# Patient Record
(demographics unavailable — no encounter records)

---

## 2024-12-16 NOTE — HISTORY OF PRESENT ILLNESS
[de-identified] :  MARYLOU DALAL is a 62 year old male who presents to the 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 I last saw the patient on 9/30/24. Pt was to discontinue inhaled steroid and follow up in 3mths. Voice has been stable, no different from last visit. No difficulty swallowing or breathing. Continues to smoke 1 pack per day. Denies fevers, chills and unintentional weight loss.  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  Previously reported: incidental finding of vocal fold cyst. He is referred by Dr. Beck Victor. He now does note periods of normal voicing. He does not note specific difficulties with swallowing. He does not note frequent classic heartburn symptoms. Smoking history: Current daily smoker, 40 year smoker, 40 pack years, current pack a day. No voice issues. 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  Prior Pertinent Procedures: 8/21/24: DL, excision of left vocal fold polyp; Dr. Madsen

## 2024-12-16 NOTE — PROCEDURE
[de-identified] : Laryngoscopy: Stroboscopic Laryngoscopy Procedure Note: Indication: Assess laryngeal biomechanics and vocal fold oscillation. Description of Procedure: Informed consent was verbally obtained from the patient prior to the procedure. The patient was seated in the clinic chair. Topical anesthesia was achieved by first spraying the nasal cavities with 4% lidocaine and nasal decongestant.  Findings: Supraglottis: mild leukoplakia petiole of epiglottis Glottis: Structure:  Right: crisp and shows no lesions or masses, minimal flat leukoplakia  Left: crisp and shows no lesions or masses  Mobility:  Right: normal  Left: normal  Amplitude:  Right: normal  Left: normal  Closure: complete  Wave symmetry: symmetric Subglottis: small anterior cyst, Visualized airway is widely patent.

## 2024-12-16 NOTE — HISTORY OF PRESENT ILLNESS
[de-identified] :  MARYLOU DALAL is a 62 year old male who presents to the 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 I last saw the patient on 9/30/24. Pt was to discontinue inhaled steroid and follow up in 3mths. Voice has been stable, no different from last visit. No difficulty swallowing or breathing. Continues to smoke 1 pack per day. Denies fevers, chills and unintentional weight loss.  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  Previously reported: incidental finding of vocal fold cyst. He is referred by Dr. Beck Victor. He now does note periods of normal voicing. He does not note specific difficulties with swallowing. He does not note frequent classic heartburn symptoms. Smoking history: Current daily smoker, 40 year smoker, 40 pack years, current pack a day. No voice issues. 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  Prior Pertinent Procedures: 8/21/24: DL, excision of left vocal fold polyp; Dr. Madsen

## 2024-12-16 NOTE — ASSESSMENT
[FreeTextEntry1] : Assessment/Plan: #1 Hx of laryngeal candidiasis #2 Left vocal fold polyp s/p excision #3 Active smoker #4 Minimal vocal fold leukoplakia #5 Mild petiole leukoplakia  Again discussed smoking cessation. Will follow up in 3 months.

## 2024-12-16 NOTE — PROCEDURE
[de-identified] : Laryngoscopy: Stroboscopic Laryngoscopy Procedure Note: Indication: Assess laryngeal biomechanics and vocal fold oscillation. Description of Procedure: Informed consent was verbally obtained from the patient prior to the procedure. The patient was seated in the clinic chair. Topical anesthesia was achieved by first spraying the nasal cavities with 4% lidocaine and nasal decongestant.  Findings: Supraglottis: mild leukoplakia petiole of epiglottis Glottis: Structure:  Right: crisp and shows no lesions or masses, minimal flat leukoplakia  Left: crisp and shows no lesions or masses  Mobility:  Right: normal  Left: normal  Amplitude:  Right: normal  Left: normal  Closure: complete  Wave symmetry: symmetric Subglottis: small anterior cyst, Visualized airway is widely patent.

## 2025-03-18 NOTE — PROCEDURE
[de-identified] : Stroboscopic Laryngoscopy Procedure Note: Indication: Assess laryngeal biomechanics and vocal fold oscillation. Description of Procedure: Informed consent was verbally obtained from the patient prior to the procedure. The patient was seated in the clinic chair. Topical anesthesia was achieved by first spraying the nasal cavities with 4% lidocaine and nasal decongestant.  Findings: Supraglottis: mild leukoplakia petiole of epiglottis Glottis: Structure:  Right: crisp and shows no lesions or masses, minimal flat leukoplakia  Left: crisp and shows no lesions or masses  Mobility:  Right: normal  Left: normal  Amplitude:  Right: normal  Left: normal  Closure: complete  Wave symmetry: symmetric Subglottis: small anterior cyst, Visualized airway is widely patent.

## 2025-03-18 NOTE — PROCEDURE
[de-identified] : Stroboscopic Laryngoscopy Procedure Note: Indication: Assess laryngeal biomechanics and vocal fold oscillation. Description of Procedure: Informed consent was verbally obtained from the patient prior to the procedure. The patient was seated in the clinic chair. Topical anesthesia was achieved by first spraying the nasal cavities with 4% lidocaine and nasal decongestant.  Findings: Supraglottis: mild leukoplakia petiole of epiglottis Glottis: Structure:  Right: crisp and shows no lesions or masses, minimal flat leukoplakia  Left: crisp and shows no lesions or masses  Mobility:  Right: normal  Left: normal  Amplitude:  Right: normal  Left: normal  Closure: complete  Wave symmetry: symmetric Subglottis: small anterior cyst, Visualized airway is widely patent.

## 2025-03-18 NOTE — HISTORY OF PRESENT ILLNESS
[de-identified] :  MARYLOU DALAL is a 62 year old male who presents to the 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 I last saw the patient on 12/16/24. Discussed smoking cessation and was to follow up in 3mths.  Recently had the flu last month and is now using supplemental oxygen more frequently at 3L [place]. Has residual cough from flu. States breathing has gotten worse, worst with exertion. Also complaining of chronic nasal congestion. Voice remains raspy- no pain while speaking or complete loss of voice. Voice is stable. Continues to smoke, trying to quit, smoking less now.  ~1/2 pack per day No recent oral infections  Eating and drinking well with no s/s of aspiration.  Denies s/s of reflux, throat pain, globus sensation, fevers/chills, neck swelling and hemoptysis.   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  Previously reported: incidental finding of vocal fold cyst. He is referred by Dr. Beck Victor. He now does note periods of normal voicing. He does not note specific difficulties with swallowing. He does not note frequent classic heartburn symptoms. Smoking history: Current daily smoker, 40 year smoker, 40 pack years, current pack a day. No voice issues. 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  Prior Pertinent Procedures: 8/21/24: DL, excision of left vocal fold polyp; Dr. Madsen

## 2025-03-18 NOTE — ASSESSMENT
[FreeTextEntry1] : Assessment/Plan: #1 Hx of laryngeal candidiasis #2 Left vocal fold polyp s/p excision #3 Active smoker #4 Minimal vocal fold leukoplakia #5 Mild petiole leukoplakia  Again discussed smoking cessation.   Patient was offered the folowin) In office biopsy of petiole leukoplakia 2) In OR direct laryngoscopy with excision of petiole leukoplakia   The risks, benefits, and alternatives to care were discussed with the patient and understanding expressed.  Instead he elected for follow up in 3 months.

## 2025-03-18 NOTE — HISTORY OF PRESENT ILLNESS
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 I last saw the patient on 12/16/24. Discussed smoking cessation and was to follow up in 3mths.  Recently had the flu last month and is now using supplemental oxygen more frequently at 3L [place]. Has residual cough from flu. States breathing has gotten worse, worst with exertion. Also complaining of chronic nasal congestion. Voice remains raspy- no pain while speaking or complete loss of voice. Voice is stable. Continues to smoke, trying to quit, smoking less now.  ~1/2 pack per day No recent oral infections  Eating and drinking well with no s/s of aspiration.  Denies s/s of reflux, throat pain, globus sensation, fevers/chills, neck swelling and hemoptysis.   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  Previously reported: incidental finding of vocal fold cyst. He is referred by Dr. Beck Victor. He now does note periods of normal voicing. He does not note specific difficulties with swallowing. He does not note frequent classic heartburn symptoms. Smoking history: Current daily smoker, 40 year smoker, 40 pack years, current pack a day. No voice issues. 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  Prior Pertinent Procedures: 8/21/24: DL, excision of left vocal fold polyp; Dr. Madsen

## 2025-06-10 NOTE — HISTORY OF PRESENT ILLNESS
[de-identified] :  MARYLOU DALAL is a 62 year old male who presents to the 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 I last saw the patient on 3/18/25. Discussed smoking cessation and was to follow up in 3mths. He denied office or OR biopsy previously.  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  Previously reported: incidental finding of vocal fold cyst. He is referred by Dr. Beck Victor. He now does note periods of normal voicing. He does not note specific difficulties with swallowing. He does not note frequent classic heartburn symptoms. Smoking history: Current daily smoker, 40 year smoker, 40 pack years, current pack a day. No voice issues. 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  Prior Pertinent Procedures: 8/21/24: DL, excision of left vocal fold polyp; Dr. Madsen

## 2025-06-10 NOTE — HISTORY OF PRESENT ILLNESS
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 I last saw the patient on 3/18/25. Discussed smoking cessation and was to follow up in 3mths. He denied office or OR biopsy previously.  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  Previously reported: incidental finding of vocal fold cyst. He is referred by Dr. Beck Victor. He now does note periods of normal voicing. He does not note specific difficulties with swallowing. He does not note frequent classic heartburn symptoms. Smoking history: Current daily smoker, 40 year smoker, 40 pack years, current pack a day. No voice issues. 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  Prior Pertinent Procedures: 8/21/24: DL, excision of left vocal fold polyp; Dr. Madsen